# Patient Record
Sex: MALE | URBAN - NONMETROPOLITAN AREA
[De-identification: names, ages, dates, MRNs, and addresses within clinical notes are randomized per-mention and may not be internally consistent; named-entity substitution may affect disease eponyms.]

---
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
## 2023-03-07 NOTE — PROCEDURE: MIPS QUALITY
----- Message from Sonia Nevarez sent at 3/6/2023 10:44 AM EST -----  Subject: Referral Request    Reason for referral request? Pt. daughter called and needs a Cardiology   Referral  Provider patient wants to be referred to(if known): Negar Marcelino    Provider Phone Number(if known):[phone]    Additional Information for Provider?  Fax# [phone]  ---------------------------------------------------------------------------  --------------  Ramya Bui INFO    [phone]; OK to leave message on voicemail  ---------------------------------------------------------------------------  --------------
Called Daughter, Suleiman Reynoso assured her that we received her message and someone will be getting in touch with her or Colletta Peacemaker shortly to schedule an appt with him for Cardiology.
Quality 431: Preventive Care And Screening: Unhealthy Alcohol Use - Screening: Patient screened for unhealthy alcohol use using a single question and scores less than 2 times per year
Detail Level: Detailed
Quality 130: Documentation Of Current Medications In The Medical Record: Current Medications Documented
Quality 131: Pain Assessment And Follow-Up: Pain assessment using a standardized tool is documented as negative, no follow-up plan required
Quality 226: Preventive Care And Screening: Tobacco Use: Screening And Cessation Intervention: Patient screened for tobacco and never smoked